# Patient Record
Sex: FEMALE | Race: WHITE | Employment: FULL TIME | URBAN - METROPOLITAN AREA
[De-identification: names, ages, dates, MRNs, and addresses within clinical notes are randomized per-mention and may not be internally consistent; named-entity substitution may affect disease eponyms.]

---

## 2023-02-22 ENCOUNTER — OFFICE VISIT (OUTPATIENT)
Dept: GYNECOLOGY | Age: 33
End: 2023-02-22

## 2023-02-22 VITALS
SYSTOLIC BLOOD PRESSURE: 102 MMHG | DIASTOLIC BLOOD PRESSURE: 60 MMHG | HEIGHT: 66 IN | WEIGHT: 220 LBS | BODY MASS INDEX: 35.36 KG/M2

## 2023-02-22 DIAGNOSIS — R10.2 PELVIC CRAMPING: Primary | ICD-10-CM

## 2023-02-22 PROCEDURE — 99214 OFFICE O/P EST MOD 30 MIN: CPT | Performed by: OBSTETRICS & GYNECOLOGY

## 2023-02-22 PROCEDURE — 76830 TRANSVAGINAL US NON-OB: CPT | Performed by: OBSTETRICS & GYNECOLOGY

## 2023-02-22 NOTE — PROGRESS NOTES
ALBARO Bacon is a 33 y.o. female seen for pelvic cramping. This started several days ago and the pain was similar to cramping like a period but much worse. She was having some diarrhea. The diarrhea has now stopped and the pain is better but not completely gone. Past Medical History, Past Surgical History, Family history, Social History, and Medications were all reviewed with the patient today and updated as necessary. Current Outpatient Medications   Medication Sig    levonorgestrel (MIRENA) IUD 52 mg 1 Device by IntraUTERine route once     No current facility-administered medications for this visit.      Allergies   Allergen Reactions    Cefaclor Other (See Comments)     As a child     Past Medical History:   Diagnosis Date    Bulging lumbar disc      Past Surgical History:   Procedure Laterality Date    WISDOM TOOTH EXTRACTION       Family History   Problem Relation Age of Onset    No Known Problems Maternal Uncle     No Known Problems Brother     No Known Problems Paternal Aunt     No Known Problems Paternal Uncle     No Known Problems Maternal Grandmother     No Known Problems Maternal Aunt     No Known Problems Paternal Grandmother     No Known Problems Paternal Grandfather     No Known Problems Sister     Hypertension Mother     No Known Problems Father     No Known Problems Maternal Grandfather       Social History     Tobacco Use    Smoking status: Never    Smokeless tobacco: Never   Substance Use Topics    Alcohol use: Yes     Comment: Socially       Social History     Substance and Sexual Activity   Sexual Activity Not on file    Comment: Inserted 2017     OB History    Para Term  AB Living   0 0 0 0 0 0   SAB IAB Ectopic Molar Multiple Live Births   0 0 0 0 0 0       Health Maintenance  Mammogram:   Colonoscopy:   Bone Density:  Pap [id number] Neg    ROS:    Review of Systems  General: Not Present- Chills, Fever, Fatigue, Insomnia, Hot flashes/Night sweats, Weight gain  Skin: Not Present- Bruising, Change in Wart/Mole, Excessive Sweating, Itching, Nail Changes, New Lesions, Rash, Skin Color Changes and Ulcer. HEENT: Not Present- Headache, Blurred Vision, Double Vision, Glaucoma, Visual Disturbances, Hearing Loss, Ringing in the Ears, Vertigo, Nose Bleed, Bleeding Gums, Hoarseness and Sore Throat. Neck: Not Present- Neck Pain and Neck Swelling. Respiratory: Not Present- Cough, Difficulty Breathing and Difficulty Breathing on Exertion. Breast: Not Present- Breast Mass, Breast Pain, Breast Swelling, Nipple Discharge, Nipple Pain, Recent Breast Size Changes and Skin Changes. Cardiovascular: Not Present- Abnormal Blood Pressure, Chest Pain, Edema, Fainting / Blacking Out, Palpitations, Shortness of Breath and Swelling of Extremities. Gastrointestinal: Not Present- Abdominal Pain, Abdominal Swelling, Bloating, Change in Bowel Habits, Constipation, Diarrhea, Difficulty Swallowing, Gets full quickly at meals, Nausea, Rectal Bleeding and Vomiting. Female Genitourinary: Not Present- Dysmenorrhea, Dyspareunia, Decreased libido, Excessive Menstrual Bleeding, Menstrual Irregularities, Pelvic Pain, Urinary Complaints, Vaginal Discharge, Vaginal itching/burning, Vaginal odor  Musculoskeletal: Not Present- Joint Pain and Muscle Pain. Neurological: Not Present- Dizziness, Fainting, Headaches and Seizures. Psychiatric: Not Present- Anxiety, Depression, Mood changes and Panic Attacks. Endocrine: Not Present- Appetite Changes, Cold Intolerance, Excessive Thirst, Excessive Urination and Heat Intolerance. Hematology: Not Present- Abnormal Bleeding, Easy Bruising and Enlarged Lymph Nodes. PHYSICAL EXAM:    /60   Ht 5' 6\" (1.676 m)   Wt 220 lb (99.8 kg)   BMI 35.51 kg/m²     Constitutional: Vital signs are normal. She appears well-developed and well-nourished. She is active and cooperative. Neurological: She is alert.    Nursing note and vitals reviewed.      Medical problems and test results were reviewed with the patient today.     ASSESSMENT and PLAN    1. Pelvic cramping  -     US NON OB TRANSVAGINAL     Comment   [id number]----demdzc cramping   HISTORY: Started having pelvic cramping midline on Sunday. Felt like she was going to have a period but doesn't   have periods because she has an IUD. IUD was placed 4/20/2017.   COMPARISON: Ultrasound 11/19/19---normal   -------------------------------------------------------------------------------------------------------   Uterus appears normal   Endo = 2.1mm with IUD seen and in place with arms extended.   Rt ovy appears normal   Lt ovy appears normal   No free fluid seen   Date: 02/22/2023 Perf. Physician: Dr. Oneida Gonsalez MD Sonographer: Sepideh Kaur RDMS         I reviewed the images of the US done in my office and discussed with patient.  Will observe for now.  Advised if pain does not go away may need CT scan      Time:  I spent  30 minutes in preparing to see patient (including chart review and preparation), obtaining and/or reviewing additional medical history, performing a physical exam and evaluation, documenting clinical information in the electronic health record, independently interpreting results, communicating results to patient, family or caregiver, and/or coordinating care.          No follow-ups on file.       ONEIDA GONSALEZ MD

## 2023-03-13 NOTE — PROGRESS NOTES
HPI    Toby Gleason is a 33 y.o. female seen for annual GYN exam.  She is not having any more cramping or diarrhea    Past Medical History, Past Surgical History, Family history, Social History, and Medications were all reviewed with the patient today and updated as necessary. Current Outpatient Medications   Medication Sig    levonorgestrel (MIRENA) IUD 52 mg 1 Device by IntraUTERine route once     No current facility-administered medications for this visit. Allergies   Allergen Reactions    Cefaclor Other (See Comments)     As a child     Past Medical History:   Diagnosis Date    Bulging lumbar disc      Past Surgical History:   Procedure Laterality Date    WISDOM TOOTH EXTRACTION       Family History   Problem Relation Age of Onset    No Known Problems Maternal Uncle     No Known Problems Brother     No Known Problems Paternal Aunt     No Known Problems Paternal Uncle     No Known Problems Maternal Grandmother     No Known Problems Maternal Aunt     No Known Problems Paternal Grandmother     No Known Problems Paternal Grandfather     No Known Problems Sister     Hypertension Mother     No Known Problems Father     No Known Problems Maternal Grandfather       Social History     Tobacco Use    Smoking status: Never    Smokeless tobacco: Never   Substance Use Topics    Alcohol use: Yes     Comment: Socially       Social History     Substance and Sexual Activity   Sexual Activity Yes    Birth control/protection: I.U.D.     Comment: Inserted 2017     OB History    Para Term  AB Living   0 0 0 0 0 0   SAB IAB Ectopic Molar Multiple Live Births   0 0 0 0 0 0       Health Maintenance  Mammogram:   Colonoscopy:   Bone Density:  Pap smear: 2022      Review of Systems  General: Not Present- Chills, Fever, Fatigue, Insomnia, Hot flashes/Night sweats, Weight gain  Skin: Not Present- Bruising, Change in Wart/Mole, Excessive Sweating, Itching, Nail Changes, New Lesions, Rash, Skin Color Changes and Ulcer. HEENT: Not Present- Headache, Blurred Vision, Double Vision, Glaucoma, Visual Disturbances, Hearing Loss, Ringing in the Ears, Vertigo, Nose Bleed, Bleeding Gums, Hoarseness and Sore Throat. Neck: Not Present- Neck Pain and Neck Swelling. Respiratory: Not Present- Cough, Difficulty Breathing and Difficulty Breathing on Exertion. Breast: Not Present- Breast Mass, Breast Pain, Breast Swelling, Nipple Discharge, Nipple Pain, Recent Breast Size Changes and Skin Changes. Cardiovascular: Not Present- Abnormal Blood Pressure, Chest Pain, Edema, Fainting / Blacking Out, Palpitations, Shortness of Breath and Swelling of Extremities. Gastrointestinal: Not Present- Abdominal Pain, Abdominal Swelling, Bloating, Change in Bowel Habits, Constipation, Diarrhea, Difficulty Swallowing, Gets full quickly at meals, Nausea, Rectal Bleeding and Vomiting. Female Genitourinary: Not Present- Dysmenorrhea, Dyspareunia, Decreased libido, Excessive Menstrual Bleeding, Menstrual Irregularities, Pelvic Pain, Urinary Complaints, Vaginal Discharge, Vaginal itching/burning, Vaginal odor  Musculoskeletal: Not Present- Joint Pain and Muscle Pain. Neurological: Not Present- Dizziness, Fainting, Headaches and Seizures. Psychiatric: Not Present- Anxiety, Depression, Mood changes and Panic Attacks. Endocrine: Not Present- Appetite Changes, Cold Intolerance, Excessive Thirst, Excessive Urination and Heat Intolerance. Hematology: Not Present- Abnormal Bleeding, Easy Bruising and Enlarged Lymph Nodes. PHYSICAL EXAM:     /80   Ht 5' 6\" (1.676 m)   Wt 220 lb (99.8 kg)   BMI 35.51 kg/m²     Physical Exam   General   Mental Status - Alert. General Appearance - Cooperative. Integumentary   General Characteristics: Overall examination of the patient's skin reveals - no rashes and no suspicious lesions. Head and Neck  Head - normocephalic, atraumatic with no lesions or palpable masses.    Neck Note: Normal Thyroid   Gland Characteristics - normal size and consistency and no palpable nodules. Chest and Lung Exam   Chest and lung exam reveals - on auscultation, normal breath sounds, no adventitious sounds and normal vocal resonance. Breast   Breast - Left - Normal. Right - Normal.     Cardiovascular   Cardiovascular examination reveals - normal heart sounds, regular rate and rhythm with no murmurs. Abdomen   Inspection: - Inspection Normal.   Palpation/Percussion: Palpation and Percussion of the abdomen reveal - Non Tender, No Rebound tenderness, No Rigidity (guarding), No hepatosplenomegaly, No Palpable abdominal masses and Soft. Auscultation: Auscultation of the abdomen reveals - Bowel sounds normal.     Female Genitourinary     External Genitalia   Vulva: - Normal. Perineum - Normal. Bartholin's Gland - Bilateral - Normal. Clitoris - Normal.   Introitus: Characteristics - Normal.   Urethra: Characteristics - Normal.     Speculum & Bimanual   Vagina: Vaginal Mucosa - Normal.   Vaginal Wall: - Normal.   Vaginal Lesions - None. Cervix: Characteristics - Normal.   Uterus: Characteristics - Normal.   Adnexa: - Normal.   Bladder - Normal.     Peripheral Vascular   Normal    Neuropsychiatric   Examination of related systems reveals - The patient is well-nourished and well-groomed. Mental status exam performed with findings of - Oriented X3 with appropriate mood and affect. Musculoskeletal  Normal      General Lymphatics  Normal           Medical problems and test results were reviewed with the patient today. ASSESSMENT and PLAN    1. Well woman exam  2. Screening for malignant neoplasm of cervix         No follow-ups on file.        Jenifer Mcpherson MD  3/14/2023

## 2023-03-14 ENCOUNTER — OFFICE VISIT (OUTPATIENT)
Dept: GYNECOLOGY | Age: 33
End: 2023-03-14
Payer: COMMERCIAL

## 2023-03-14 VITALS
SYSTOLIC BLOOD PRESSURE: 110 MMHG | DIASTOLIC BLOOD PRESSURE: 80 MMHG | BODY MASS INDEX: 35.36 KG/M2 | HEIGHT: 66 IN | WEIGHT: 220 LBS

## 2023-03-14 DIAGNOSIS — Z12.4 ENCOUNTER FOR PAPANICOLAOU SMEAR FOR CERVICAL CANCER SCREENING: ICD-10-CM

## 2023-03-14 DIAGNOSIS — Z01.419 WELL WOMAN EXAM: Primary | ICD-10-CM

## 2023-03-14 PROCEDURE — 99395 PREV VISIT EST AGE 18-39: CPT | Performed by: OBSTETRICS & GYNECOLOGY

## 2023-03-21 LAB
CYTOLOGIST CVX/VAG CYTO: NORMAL
CYTOLOGY CVX/VAG DOC THIN PREP: NORMAL
HPV REFLEX: NORMAL
Lab: NORMAL
Lab: NORMAL
PATH REPORT.FINAL DX SPEC: NORMAL
STAT OF ADQ CVX/VAG CYTO-IMP: NORMAL

## 2023-10-24 ENCOUNTER — OFFICE VISIT (OUTPATIENT)
Dept: FAMILY MEDICINE CLINIC | Facility: CLINIC | Age: 33
End: 2023-10-24
Payer: COMMERCIAL

## 2023-10-24 VITALS
SYSTOLIC BLOOD PRESSURE: 118 MMHG | OXYGEN SATURATION: 98 % | BODY MASS INDEX: 35.39 KG/M2 | WEIGHT: 220.2 LBS | DIASTOLIC BLOOD PRESSURE: 80 MMHG | HEART RATE: 71 BPM | HEIGHT: 66 IN

## 2023-10-24 DIAGNOSIS — Z11.59 NEED FOR HEPATITIS C SCREENING TEST: ICD-10-CM

## 2023-10-24 DIAGNOSIS — Z20.2 POSSIBLE EXPOSURE TO STD: ICD-10-CM

## 2023-10-24 DIAGNOSIS — Z23 ENCOUNTER FOR IMMUNIZATION: ICD-10-CM

## 2023-10-24 DIAGNOSIS — E55.9 VITAMIN D DEFICIENCY: ICD-10-CM

## 2023-10-24 DIAGNOSIS — Z11.59 ENCOUNTER FOR SCREENING FOR VIRAL DISEASE: ICD-10-CM

## 2023-10-24 DIAGNOSIS — Z00.00 ROUTINE GENERAL MEDICAL EXAMINATION AT A HEALTH CARE FACILITY: Primary | ICD-10-CM

## 2023-10-24 DIAGNOSIS — Z11.4 ENCOUNTER FOR SCREENING FOR HIV: ICD-10-CM

## 2023-10-24 LAB
25(OH)D3 SERPL-MCNC: 20 NG/ML (ref 30–100)
ALBUMIN SERPL-MCNC: 4.4 G/DL (ref 3.5–5)
ALBUMIN/GLOB SERPL: 1.3 (ref 0.4–1.6)
ALP SERPL-CCNC: 62 U/L (ref 50–136)
ALT SERPL-CCNC: 20 U/L (ref 12–65)
ANION GAP SERPL CALC-SCNC: 6 MMOL/L (ref 2–11)
AST SERPL-CCNC: 13 U/L (ref 15–37)
BASOPHILS # BLD: 0 K/UL (ref 0–0.2)
BASOPHILS NFR BLD: 1 % (ref 0–2)
BILIRUB SERPL-MCNC: 0.8 MG/DL (ref 0.2–1.1)
BILIRUBIN, URINE, POC: NEGATIVE
BLOOD URINE, POC: NEGATIVE
BUN SERPL-MCNC: 9 MG/DL (ref 6–23)
CALCIUM SERPL-MCNC: 9.1 MG/DL (ref 8.3–10.4)
CHLORIDE SERPL-SCNC: 112 MMOL/L (ref 101–110)
CHOLEST SERPL-MCNC: 171 MG/DL
CO2 SERPL-SCNC: 25 MMOL/L (ref 21–32)
CREAT SERPL-MCNC: 0.9 MG/DL (ref 0.6–1)
DIFFERENTIAL METHOD BLD: ABNORMAL
EOSINOPHIL # BLD: 0.1 K/UL (ref 0–0.8)
EOSINOPHIL NFR BLD: 2 % (ref 0.5–7.8)
ERYTHROCYTE [DISTWIDTH] IN BLOOD BY AUTOMATED COUNT: 13.1 % (ref 11.9–14.6)
GLOBULIN SER CALC-MCNC: 3.4 G/DL (ref 2.8–4.5)
GLUCOSE SERPL-MCNC: 97 MG/DL (ref 65–100)
GLUCOSE URINE, POC: NEGATIVE
HCT VFR BLD AUTO: 46.6 % (ref 35.8–46.3)
HCV AB SER QL: NONREACTIVE
HDLC SERPL-MCNC: 30 MG/DL (ref 40–60)
HDLC SERPL: 5.7
HGB BLD-MCNC: 15.7 G/DL (ref 11.7–15.4)
HIV 1+2 AB+HIV1 P24 AG SERPL QL IA: NONREACTIVE
HIV 1/2 RESULT COMMENT: NORMAL
IMM GRANULOCYTES # BLD AUTO: 0 K/UL (ref 0–0.5)
IMM GRANULOCYTES NFR BLD AUTO: 0 % (ref 0–5)
KETONES, URINE, POC: NEGATIVE
LDLC SERPL CALC-MCNC: 84.4 MG/DL
LEUKOCYTE ESTERASE, URINE, POC: NEGATIVE
LYMPHOCYTES # BLD: 1.7 K/UL (ref 0.5–4.6)
LYMPHOCYTES NFR BLD: 30 % (ref 13–44)
MCH RBC QN AUTO: 30.8 PG (ref 26.1–32.9)
MCHC RBC AUTO-ENTMCNC: 33.7 G/DL (ref 31.4–35)
MCV RBC AUTO: 91.6 FL (ref 82–102)
MONOCYTES # BLD: 0.3 K/UL (ref 0.1–1.3)
MONOCYTES NFR BLD: 6 % (ref 4–12)
NEUTS SEG # BLD: 3.4 K/UL (ref 1.7–8.2)
NEUTS SEG NFR BLD: 61 % (ref 43–78)
NITRITE, URINE, POC: NEGATIVE
NRBC # BLD: 0 K/UL (ref 0–0.2)
PH, URINE, POC: 5.5 (ref 4.6–8)
PLATELET # BLD AUTO: 302 K/UL (ref 150–450)
PMV BLD AUTO: 9.9 FL (ref 9.4–12.3)
POTASSIUM SERPL-SCNC: 4.1 MMOL/L (ref 3.5–5.1)
PROT SERPL-MCNC: 7.8 G/DL (ref 6.3–8.2)
PROTEIN,URINE, POC: NEGATIVE
RBC # BLD AUTO: 5.09 M/UL (ref 4.05–5.2)
SODIUM SERPL-SCNC: 143 MMOL/L (ref 133–143)
SPECIFIC GRAVITY, URINE, POC: 1.03 (ref 1–1.03)
TRIGL SERPL-MCNC: 283 MG/DL (ref 35–150)
TSH W FREE THYROID IF ABNORMAL: 2.48 UIU/ML (ref 0.36–3.74)
URINALYSIS CLARITY, POC: CLEAR
URINALYSIS COLOR, POC: YELLOW
UROBILINOGEN, POC: NORMAL
VLDLC SERPL CALC-MCNC: 56.6 MG/DL (ref 6–23)
WBC # BLD AUTO: 5.6 K/UL (ref 4.3–11.1)

## 2023-10-24 PROCEDURE — 90674 CCIIV4 VAC NO PRSV 0.5 ML IM: CPT | Performed by: NURSE PRACTITIONER

## 2023-10-24 PROCEDURE — 81002 URINALYSIS NONAUTO W/O SCOPE: CPT | Performed by: NURSE PRACTITIONER

## 2023-10-24 PROCEDURE — 90471 IMMUNIZATION ADMIN: CPT | Performed by: NURSE PRACTITIONER

## 2023-10-24 PROCEDURE — 99385 PREV VISIT NEW AGE 18-39: CPT | Performed by: NURSE PRACTITIONER

## 2023-10-24 SDOH — ECONOMIC STABILITY: INCOME INSECURITY: HOW HARD IS IT FOR YOU TO PAY FOR THE VERY BASICS LIKE FOOD, HOUSING, MEDICAL CARE, AND HEATING?: NOT HARD AT ALL

## 2023-10-24 SDOH — ECONOMIC STABILITY: FOOD INSECURITY: WITHIN THE PAST 12 MONTHS, THE FOOD YOU BOUGHT JUST DIDN'T LAST AND YOU DIDN'T HAVE MONEY TO GET MORE.: NEVER TRUE

## 2023-10-24 SDOH — ECONOMIC STABILITY: HOUSING INSECURITY
IN THE LAST 12 MONTHS, WAS THERE A TIME WHEN YOU DID NOT HAVE A STEADY PLACE TO SLEEP OR SLEPT IN A SHELTER (INCLUDING NOW)?: NO

## 2023-10-24 SDOH — ECONOMIC STABILITY: FOOD INSECURITY: WITHIN THE PAST 12 MONTHS, YOU WORRIED THAT YOUR FOOD WOULD RUN OUT BEFORE YOU GOT MONEY TO BUY MORE.: NEVER TRUE

## 2023-10-24 ASSESSMENT — ENCOUNTER SYMPTOMS
VOICE CHANGE: 0
ABDOMINAL DISTENTION: 0
ABDOMINAL PAIN: 0
EYE DISCHARGE: 0
CHOKING: 0
COUGH: 0
RESPIRATORY NEGATIVE: 1
STRIDOR: 0
ALLERGIC/IMMUNOLOGIC NEGATIVE: 1
EYE ITCHING: 0
CONSTIPATION: 0
SHORTNESS OF BREATH: 0
COLOR CHANGE: 0
SINUS PAIN: 0
RECTAL PAIN: 0
NAUSEA: 0
BACK PAIN: 0
SORE THROAT: 0
TROUBLE SWALLOWING: 0
GASTROINTESTINAL NEGATIVE: 1
ANAL BLEEDING: 0
FACIAL SWELLING: 0
PHOTOPHOBIA: 0
SINUS PRESSURE: 0
EYE PAIN: 0
EYES NEGATIVE: 1
CHEST TIGHTNESS: 0
DIARRHEA: 0
BLOOD IN STOOL: 0
VOMITING: 0
RHINORRHEA: 0
WHEEZING: 0
APNEA: 0
EYE REDNESS: 0

## 2023-10-24 ASSESSMENT — ANXIETY QUESTIONNAIRES
IF YOU CHECKED OFF ANY PROBLEMS ON THIS QUESTIONNAIRE, HOW DIFFICULT HAVE THESE PROBLEMS MADE IT FOR YOU TO DO YOUR WORK, TAKE CARE OF THINGS AT HOME, OR GET ALONG WITH OTHER PEOPLE: NOT DIFFICULT AT ALL
3. WORRYING TOO MUCH ABOUT DIFFERENT THINGS: 1
4. TROUBLE RELAXING: 0
5. BEING SO RESTLESS THAT IT IS HARD TO SIT STILL: 0
1. FEELING NERVOUS, ANXIOUS, OR ON EDGE: 1
7. FEELING AFRAID AS IF SOMETHING AWFUL MIGHT HAPPEN: 0
6. BECOMING EASILY ANNOYED OR IRRITABLE: 1
2. NOT BEING ABLE TO STOP OR CONTROL WORRYING: 0
GAD7 TOTAL SCORE: 3

## 2023-10-24 ASSESSMENT — PATIENT HEALTH QUESTIONNAIRE - PHQ9
SUM OF ALL RESPONSES TO PHQ QUESTIONS 1-9: 0
1. LITTLE INTEREST OR PLEASURE IN DOING THINGS: 0
SUM OF ALL RESPONSES TO PHQ QUESTIONS 1-9: 0

## 2023-10-24 NOTE — PROGRESS NOTES
IM, PF, 0.5 mL    Patient is due for annual flu vaccine. Discussed current CDC recommendation for immunization. Pt is amenable to receiving vaccine today in office. VIS provided. Vaccine administered. 3. Encounter for screening for HIV  -     HIV 1/2 Ag/Ab, 4TH Generation,W Rflx Confirm; Future    4. Need for hepatitis C screening test  -     Hepatitis C Antibody; Future    5. Vitamin D deficiency  -     Vitamin D 25 Hydroxy; Future    6. Encounter for screening for viral disease  -     Chlamydia, Gonorrhea, Trichomoniasis; Future  -     RPR; Future  -     HSV 1 and 2 Specific Ab, IgG; Future    7. Possible exposure to STD  -     Chlamydia, Gonorrhea, Trichomoniasis; Future  -     RPR; Future  -     HSV 1 and 2 Specific Ab, IgG;  Future      Orders Placed This Encounter   Procedures    Chlamydia, Gonorrhea, Trichomoniasis     Standing Status:   Future     Standing Expiration Date:   10/24/2024    Influenza, FLUCELVAX, (age 10 mo+), IM, PF, 0.5 mL    Hepatitis C Antibody     Standing Status:   Future     Standing Expiration Date:   10/24/2024    Vitamin D 25 Hydroxy     Standing Status:   Future     Standing Expiration Date:   10/24/2024    Lipid Panel     Standing Status:   Future     Standing Expiration Date:   10/24/2024    Comprehensive Metabolic Panel     Standing Status:   Future     Standing Expiration Date:   10/24/2024    HIV 1/2 Ag/Ab, 4TH Generation,W Rflx Confirm     Standing Status:   Future     Standing Expiration Date:   10/24/2024    CBC with Auto Differential     Standing Status:   Future     Standing Expiration Date:   10/24/2024    TSH with Reflex     Standing Status:   Future     Standing Expiration Date:   10/24/2024    RPR     Standing Status:   Future     Standing Expiration Date:   10/24/2024    HSV 1 and 2 Specific Ab, IgG     Standing Status:   Future     Standing Expiration Date:   10/24/2024    AMB POC URINALYSIS DIP STICK MANUAL W/O MICRO     Standing Status:   Future     Standing

## 2023-10-25 LAB
HSV1 IGG SER IA-ACNC: 36.5 INDEX (ref 0–0.9)
HSV2 IGG SER IA-ACNC: 0.94 INDEX (ref 0–0.9)
RPR SER QL: NONREACTIVE

## 2023-10-26 LAB
C TRACH RRNA SPEC QL NAA+PROBE: NEGATIVE
N GONORRHOEA RRNA SPEC QL NAA+PROBE: NEGATIVE
SPECIMEN SOURCE: NORMAL
T VAGINALIS RRNA SPEC QL NAA+PROBE: NEGATIVE

## 2023-10-26 RX ORDER — ERGOCALCIFEROL 1.25 MG/1
50000 CAPSULE ORAL WEEKLY
Qty: 12 CAPSULE | Refills: 0 | Status: SHIPPED | OUTPATIENT
Start: 2023-10-26

## 2024-01-17 ENCOUNTER — NURSE ONLY (OUTPATIENT)
Dept: FAMILY MEDICINE CLINIC | Facility: CLINIC | Age: 34
End: 2024-01-17

## 2024-01-17 DIAGNOSIS — E55.9 VITAMIN D DEFICIENCY: Primary | ICD-10-CM

## 2024-01-17 DIAGNOSIS — E55.9 VITAMIN D DEFICIENCY: ICD-10-CM

## 2024-01-17 DIAGNOSIS — E78.5 BORDERLINE HYPERLIPIDEMIA: ICD-10-CM

## 2024-01-17 LAB
25(OH)D3 SERPL-MCNC: 51.4 NG/ML (ref 30–100)
ALBUMIN SERPL-MCNC: 4 G/DL (ref 3.5–5)
ALBUMIN/GLOB SERPL: 1.3 (ref 0.4–1.6)
ALP SERPL-CCNC: 58 U/L (ref 50–136)
ALT SERPL-CCNC: 21 U/L (ref 12–65)
ANION GAP SERPL CALC-SCNC: 4 MMOL/L (ref 2–11)
AST SERPL-CCNC: 12 U/L (ref 15–37)
BILIRUB SERPL-MCNC: 0.9 MG/DL (ref 0.2–1.1)
BUN SERPL-MCNC: 10 MG/DL (ref 6–23)
CALCIUM SERPL-MCNC: 8.7 MG/DL (ref 8.3–10.4)
CHLORIDE SERPL-SCNC: 107 MMOL/L (ref 103–113)
CHOLEST SERPL-MCNC: 165 MG/DL
CO2 SERPL-SCNC: 26 MMOL/L (ref 21–32)
CREAT SERPL-MCNC: 0.9 MG/DL (ref 0.6–1)
GLOBULIN SER CALC-MCNC: 3 G/DL (ref 2.8–4.5)
GLUCOSE SERPL-MCNC: 94 MG/DL (ref 65–100)
HDLC SERPL-MCNC: 36 MG/DL (ref 40–60)
HDLC SERPL: 4.6
LDLC SERPL CALC-MCNC: 91.6 MG/DL
POTASSIUM SERPL-SCNC: 4.1 MMOL/L (ref 3.5–5.1)
PROT SERPL-MCNC: 7 G/DL (ref 6.3–8.2)
SODIUM SERPL-SCNC: 137 MMOL/L (ref 136–146)
TRIGL SERPL-MCNC: 187 MG/DL (ref 35–150)
VLDLC SERPL CALC-MCNC: 37.4 MG/DL (ref 6–23)

## 2024-02-27 ENCOUNTER — OFFICE VISIT (OUTPATIENT)
Dept: FAMILY MEDICINE CLINIC | Facility: CLINIC | Age: 34
End: 2024-02-27
Payer: COMMERCIAL

## 2024-02-27 VITALS
BODY MASS INDEX: 34.39 KG/M2 | HEIGHT: 66 IN | HEART RATE: 88 BPM | SYSTOLIC BLOOD PRESSURE: 110 MMHG | WEIGHT: 214 LBS | OXYGEN SATURATION: 98 % | DIASTOLIC BLOOD PRESSURE: 80 MMHG

## 2024-02-27 DIAGNOSIS — R19.4 BOWEL HABIT CHANGES: ICD-10-CM

## 2024-02-27 DIAGNOSIS — K62.5 RECTAL BLEEDING: Primary | ICD-10-CM

## 2024-02-27 DIAGNOSIS — K64.8 OTHER HEMORRHOIDS: ICD-10-CM

## 2024-02-27 LAB
ALBUMIN SERPL-MCNC: 4 G/DL (ref 3.5–5)
ALBUMIN/GLOB SERPL: 1.3 (ref 0.4–1.6)
ALP SERPL-CCNC: 59 U/L (ref 50–136)
ALT SERPL-CCNC: 19 U/L (ref 12–65)
ANION GAP SERPL CALC-SCNC: 4 MMOL/L (ref 2–11)
AST SERPL-CCNC: 9 U/L (ref 15–37)
BILIRUB SERPL-MCNC: 0.7 MG/DL (ref 0.2–1.1)
BUN SERPL-MCNC: 9 MG/DL (ref 6–23)
CALCIUM SERPL-MCNC: 9.5 MG/DL (ref 8.3–10.4)
CHLORIDE SERPL-SCNC: 108 MMOL/L (ref 103–113)
CO2 SERPL-SCNC: 29 MMOL/L (ref 21–32)
CREAT SERPL-MCNC: 0.8 MG/DL (ref 0.6–1)
GLOBULIN SER CALC-MCNC: 3.1 G/DL (ref 2.8–4.5)
GLUCOSE SERPL-MCNC: 97 MG/DL (ref 65–100)
GRANS ABSOLUTE, POC: 2.7 K/UL
GRANULOCYTES %, POC: 53.3 %
HEMATOCRIT, POC: 45.1 %
HEMOGLOBIN, POC: 14.6 G/DL
LYMPHOCYTE %, POC: 39 %
LYMPHS ABSOLUTE, POC: 2 K/UL
MCH, POC: NORMAL PG (ref 40–?)
MCHC, POC: 32.4
MCV, POC: 96.3
MONOCYTE %, POC: 7.7 %
MONOCYTE, ABSOLUTE POC: 0.4 K/UL
MPV, POC: 7.5 FL
PLATELET COUNT, POC: 321 K/UL
POTASSIUM SERPL-SCNC: 4.3 MMOL/L (ref 3.5–5.1)
PROT SERPL-MCNC: 7.1 G/DL (ref 6.3–8.2)
RBC, POC: 4.68 M/UL
RDW, POC: 13.4 %
SODIUM SERPL-SCNC: 141 MMOL/L (ref 136–146)
WBC, POC: 5.1 K/UL

## 2024-02-27 PROCEDURE — 85025 COMPLETE CBC W/AUTO DIFF WBC: CPT | Performed by: NURSE PRACTITIONER

## 2024-02-27 PROCEDURE — 99214 OFFICE O/P EST MOD 30 MIN: CPT | Performed by: NURSE PRACTITIONER

## 2024-02-27 RX ORDER — HYDROCORTISONE 25 MG/G
1 CREAM TOPICAL 3 TIMES DAILY PRN
Qty: 60 G | Refills: 2 | Status: SHIPPED | OUTPATIENT
Start: 2024-02-27

## 2024-02-27 ASSESSMENT — PATIENT HEALTH QUESTIONNAIRE - PHQ9
2. FEELING DOWN, DEPRESSED OR HOPELESS: 0
1. LITTLE INTEREST OR PLEASURE IN DOING THINGS: 0
SUM OF ALL RESPONSES TO PHQ9 QUESTIONS 1 & 2: 0
SUM OF ALL RESPONSES TO PHQ QUESTIONS 1-9: 0

## 2024-02-27 ASSESSMENT — ENCOUNTER SYMPTOMS
EYE ITCHING: 0
ABDOMINAL DISTENTION: 0
SHORTNESS OF BREATH: 0
CHEST TIGHTNESS: 0
BACK PAIN: 0
WHEEZING: 0
EYES NEGATIVE: 1
CONSTIPATION: 0
BLOOD IN STOOL: 0
EYE PAIN: 0
EYE REDNESS: 0
ABDOMINAL PAIN: 0
RHINORRHEA: 0
RECTAL PAIN: 0
TROUBLE SWALLOWING: 0
ALLERGIC/IMMUNOLOGIC NEGATIVE: 1
ANAL BLEEDING: 0
EYE DISCHARGE: 0
VOICE CHANGE: 0
COLOR CHANGE: 0
PHOTOPHOBIA: 0
APNEA: 0
CHOKING: 0
SORE THROAT: 0
RESPIRATORY NEGATIVE: 1
NAUSEA: 0
FACIAL SWELLING: 0
STRIDOR: 0
SINUS PAIN: 0
GASTROINTESTINAL NEGATIVE: 1
SINUS PRESSURE: 0
VOMITING: 0
DIARRHEA: 0
COUGH: 0

## 2024-02-27 NOTE — PROGRESS NOTES
difficulty urinating, dysuria, enuresis, flank pain, frequency, genital sores, hematuria and urgency.   Musculoskeletal: Negative.  Negative for arthralgias, back pain, gait problem, joint swelling, myalgias, neck pain and neck stiffness.   Skin: Negative.  Negative for color change, pallor, rash and wound.   Allergic/Immunologic: Negative.  Negative for environmental allergies, food allergies and immunocompromised state.   Neurological: Negative.  Negative for dizziness, tremors, seizures, syncope, facial asymmetry, speech difficulty, weakness, light-headedness, numbness and headaches.   Hematological: Negative.  Negative for adenopathy. Does not bruise/bleed easily.   Psychiatric/Behavioral: Negative.  Negative for agitation, behavioral problems, confusion, decreased concentration, dysphoric mood, hallucinations, self-injury, sleep disturbance and suicidal ideas. The patient is not nervous/anxious and is not hyperactive.        OBJECTIVE:  /80   Pulse 88   Ht 1.676 m (5' 6\")   Wt 97.1 kg (214 lb)   SpO2 98%   BMI 34.54 kg/m²      Physical Exam  Vitals and nursing note reviewed. Exam conducted with a chaperone present (Exam chaperoned by Millie Nogueira MA).   Constitutional:       Appearance: Normal appearance.   HENT:      Head: Normocephalic and atraumatic.   Cardiovascular:      Rate and Rhythm: Normal rate and regular rhythm.      Pulses: Normal pulses.      Heart sounds: Normal heart sounds.   Pulmonary:      Effort: Pulmonary effort is normal.      Breath sounds: Normal breath sounds.   Genitourinary:     Rectum: Guaiac result negative.      Comments: Negative guaiac stool.  Small external hemorrhoid at 12:00 location and 6:00 location.  Internal hemorrhoids noted to 2 to 4 o'clock position  Neurological:      Mental Status: She is alert.           Medical problems and test results were reviewed with the patient today.       ASSESSMENT and PLAN    1. Rectal bleeding  -     AMB POC KTY COMPLETE CBC;

## 2024-04-16 NOTE — PROGRESS NOTES
0   SAB IAB Ectopic Molar Multiple Live Births   0 0 0 0 0 0       Health Maintenance    Pap smear:3/14/23      Review of Systems  General: Not Present- Chills, Fever, Fatigue, Insomnia, Hot flashes/Night sweats, Weight gain  Skin: Not Present- Bruising, Change in Wart/Mole, Excessive Sweating, Itching, Nail Changes, New Lesions, Rash, Skin Color Changes and Ulcer.  HEENT: Not Present- Headache, Blurred Vision, Double Vision, Glaucoma, Visual Disturbances, Hearing Loss, Ringing in the Ears, Vertigo, Nose Bleed, Bleeding Gums, Hoarseness and Sore Throat.  Neck: Not Present- Neck Pain and Neck Swelling.  Respiratory: Not Present- Cough, Difficulty Breathing and Difficulty Breathing on Exertion.  Breast: Not Present- Breast Mass, Breast Pain, Breast Swelling, Nipple Discharge, Nipple Pain, Recent Breast Size Changes and Skin Changes.  Cardiovascular: Not Present- Abnormal Blood Pressure, Chest Pain, Edema, Fainting / Blacking Out, Palpitations, Shortness of Breath and Swelling of Extremities.  Gastrointestinal: Not Present- Abdominal Pain, Abdominal Swelling, Bloating, Change in Bowel Habits, Constipation, Diarrhea, Difficulty Swallowing, Gets full quickly at meals, Nausea, Rectal Bleeding and Vomiting.  Female Genitourinary: Not Present- Dysmenorrhea, Dyspareunia, Decreased libido, Excessive Menstrual Bleeding, Menstrual Irregularities, Pelvic Pain, Urinary Complaints, Vaginal Discharge, Vaginal itching/burning, Vaginal odor  Musculoskeletal: Not Present- Joint Pain and Muscle Pain.  Neurological: Not Present- Dizziness, Fainting, Headaches and Seizures.  Psychiatric: Not Present- Anxiety, Depression, Mood changes and Panic Attacks.  Endocrine: Not Present- Appetite Changes, Cold Intolerance, Excessive Thirst, Excessive Urination and Heat Intolerance.  Hematology: Not Present- Abnormal Bleeding, Easy Bruising and Enlarged Lymph Nodes.         PHYSICAL EXAM:     /84   Ht 1.676 m (5' 6\")   Wt 96.2 kg (212 lb)

## 2024-04-22 ENCOUNTER — OFFICE VISIT (OUTPATIENT)
Dept: OBGYN CLINIC | Age: 34
End: 2024-04-22
Payer: COMMERCIAL

## 2024-04-22 VITALS
BODY MASS INDEX: 34.07 KG/M2 | WEIGHT: 212 LBS | SYSTOLIC BLOOD PRESSURE: 122 MMHG | HEIGHT: 66 IN | DIASTOLIC BLOOD PRESSURE: 84 MMHG

## 2024-04-22 DIAGNOSIS — Z01.419 ENCOUNTER FOR WELL WOMAN EXAM WITH ROUTINE GYNECOLOGICAL EXAM: Primary | ICD-10-CM

## 2024-04-22 DIAGNOSIS — Z12.4 CERVICAL CANCER SCREENING: ICD-10-CM

## 2024-04-22 PROCEDURE — 99459 PELVIC EXAMINATION: CPT | Performed by: OBSTETRICS & GYNECOLOGY

## 2024-04-22 PROCEDURE — 99395 PREV VISIT EST AGE 18-39: CPT | Performed by: OBSTETRICS & GYNECOLOGY

## 2024-04-26 LAB
COLLECTION METHOD: NORMAL
CYTOLOGIST CVX/VAG CYTO: NORMAL
CYTOLOGY CVX/VAG DOC THIN PREP: NORMAL
HPV REFLEX: NORMAL
Lab: NORMAL
OTHER PT INFO: NORMAL
PAP SOURCE: NORMAL
PATH REPORT.FINAL DX SPEC: NORMAL
PREV TREATMENT: NORMAL
STAT OF ADQ CVX/VAG CYTO-IMP: NORMAL

## 2024-05-15 NOTE — PATIENT INSTRUCTIONS
For hemorrhoids:   Take Miralax 1 cap daily for constipation and titrate up or down as needed until having regular daily bowel movements. Increase daily fiber intake to 25-35 grams daily either by dietary intake or an over-the-counter supplement such as psyllium husk fiber. Limit alcohol and fatty food intake. Drink at least 80 oz water daily. Exercise regularly and consider weight loss if appropriate based on your current weight. Avoid straining or lingering on the toilet longer than necessary. Try scheduled toilet time approximately 30 minutes after your first drink or meal of the day. Elevate your feet when toileting to bring your knees in line with your hips using a small stool or Squatty Potty. Review your medication list and discuss with your PCP whether any of these medications may exacerbate constipation. Try warm sitz baths 2-3 times daily for acute hemorrhoidal flare-up. Apply topical or suppository steroid (such as Preparation H or Anusol) for no longer than 7 days. You can also try lidocaine creams (Preparation H + Lidocaine) or Recticare 5% for pain relief. Try witch hazel pads for itching or burning. Keep the anal area clean and dry; avoid excessive or aggressive wiping. Consider wet wipe usage as needed for hygiene and comfort.       For constipation:  Recommend Miralax 1 cap 1-2 x daily and stool softener (ie Pericolace) twice daily. You can add milk of magnesia or magnesium citrate as needed.  Add Dulcolax or glycerin suppository if no bowel movement after 2-3 days.    Increase daily fiber intake to 25-35 grams daily either by dietary intake or an over-the-counter supplement such as psyllium husk fiber.  Limit alcohol and fatty food intake. Drink at least 80 oz water daily or more as needed to maintain adequate hydration. Exercise regularly and consider weight loss if appropriate based on your current weight. Avoid straining or lingering on the toilet longer than necessary. Try scheduled toilet time

## 2024-05-15 NOTE — PROGRESS NOTES
Abel Garcia (:  1990) is a 33 y.o. female new patient referred to our office for evaluation of the following chief complaint(s):  New Patient (Rectal bleeding)        Assessment & Plan   ASSESSMENT/PLAN:  1. BRBPR (bright red blood per rectum)  -     hydrocortisone (PROCTOSOL HC) 2.5 % CREA rectal cream; Apply BID internally to rectum x 7 days for rectal bleeding/hemorrhoids, Disp-28 g, R-0, Normal  2. Chronic diarrhea  -     Celiac Ab tTg DGP TIgA; Future  -     Calprotectin Stool; Future  -     High sensitivity CRP; Future  -     Sedimentation Rate; Future  -     hydrocortisone (PROCTOSOL HC) 2.5 % CREA rectal cream; Apply BID internally to rectum x 7 days for rectal bleeding/hemorrhoids, Disp-28 g, R-0, Normal  -     dicyclomine (BENTYL) 10 MG capsule; Take 1 capsule by mouth 3 times daily (before meals), Disp-90 capsule, R-2Normal  3. Abdominal cramping    -No obvious hemorrhoid on exam though possible a small internal hemorrhoid could be missed. Will give steroid cream to use in case bleeding recurs.  -She was diagnosed with IBS in childhood. We discussed work-up to r/o IBD, polyp, less likely malignancy. She prefers to start with less-invasive evaluation. Will obtain IBD screening as well as celiac panel. If abnormal will pursue endoscopy. Otherwise trial Bentyl, add stool bulking fiber supplement, and low-FODMAP diet with 3 month follow-up. If symptoms persist will revisit endoscopy at that time.    Subjective   SUBJECTIVE/OBJECTIVE  Abel Garcia is a 33 y.o. year old female with PMH is pertinent for obesity with BMI 34.22. She denies abdominal surgical history.     Patient was referred to our office for evaluation of rectal bleeding and bowel habit changes.  Referral note reviewed from 2024.  Noted to have history of IBS, primarily constipation type with associated abdominal cramping. No prior GI or endoscopic evaluation discovered on chart review.    Today patient reports

## 2024-05-16 ENCOUNTER — OFFICE VISIT (OUTPATIENT)
Age: 34
End: 2024-05-16
Payer: COMMERCIAL

## 2024-05-16 VITALS
WEIGHT: 212 LBS | BODY MASS INDEX: 34.07 KG/M2 | SYSTOLIC BLOOD PRESSURE: 116 MMHG | HEIGHT: 66 IN | DIASTOLIC BLOOD PRESSURE: 77 MMHG | OXYGEN SATURATION: 98 %

## 2024-05-16 DIAGNOSIS — R10.9 ABDOMINAL CRAMPING: ICD-10-CM

## 2024-05-16 DIAGNOSIS — K52.9 CHRONIC DIARRHEA: ICD-10-CM

## 2024-05-16 DIAGNOSIS — K62.5 BRBPR (BRIGHT RED BLOOD PER RECTUM): Primary | ICD-10-CM

## 2024-05-16 PROCEDURE — 99204 OFFICE O/P NEW MOD 45 MIN: CPT | Performed by: PHYSICIAN ASSISTANT

## 2024-05-16 RX ORDER — DICYCLOMINE HYDROCHLORIDE 10 MG/1
10 CAPSULE ORAL
Qty: 90 CAPSULE | Refills: 2 | Status: SHIPPED | OUTPATIENT
Start: 2024-05-16

## 2024-05-16 RX ORDER — HYDROCORTISONE 25 MG/G
CREAM TOPICAL
Qty: 28 G | Refills: 0 | Status: SHIPPED | OUTPATIENT
Start: 2024-05-16

## 2024-06-07 ENCOUNTER — TELEPHONE (OUTPATIENT)
Age: 34
End: 2024-06-07

## 2024-06-07 NOTE — TELEPHONE ENCOUNTER
----- Message from Melissa R Grinnell, PA-C sent at 6/6/2024  8:01 PM EDT -----  Please remind patient to submit labs/stool studies as discussed in office. Thanks.

## 2024-06-07 NOTE — TELEPHONE ENCOUNTER
As requested by provider. Talked to patient and gave reminder to submit labs/stool studies as discussed in office. Pt said she hasn't been able to do it, bur she has in mind, and it will get done asap.

## 2024-06-11 DIAGNOSIS — K52.9 CHRONIC DIARRHEA: ICD-10-CM

## 2024-06-11 LAB
CRP SERPL HS-MCNC: 0.7 MG/L (ref 0–3)
ERYTHROCYTE [SEDIMENTATION RATE] IN BLOOD: 2 MM/HR (ref 0–20)

## 2024-06-13 LAB
GLIADIN PEPTIDE IGA SER-ACNC: 4 UNITS (ref 0–19)
GLIADIN PEPTIDE IGG SER-ACNC: 3 UNITS (ref 0–19)
IGA SERPL-MCNC: 194 MG/DL (ref 87–352)
TTG IGA SER-ACNC: <2 U/ML (ref 0–3)
TTG IGG SER-ACNC: <2 U/ML (ref 0–5)

## 2024-06-18 ENCOUNTER — TELEMEDICINE (OUTPATIENT)
Dept: FAMILY MEDICINE CLINIC | Facility: CLINIC | Age: 34
End: 2024-06-18
Payer: COMMERCIAL

## 2024-06-18 DIAGNOSIS — F32.A DEPRESSION, UNSPECIFIED DEPRESSION TYPE: ICD-10-CM

## 2024-06-18 DIAGNOSIS — G47.00 INSOMNIA, UNSPECIFIED TYPE: ICD-10-CM

## 2024-06-18 DIAGNOSIS — F41.1 GENERALIZED ANXIETY DISORDER: Primary | ICD-10-CM

## 2024-06-18 LAB — CALPROTECTIN STL-MCNT: 19 UG/G (ref 0–120)

## 2024-06-18 PROCEDURE — 99214 OFFICE O/P EST MOD 30 MIN: CPT | Performed by: NURSE PRACTITIONER

## 2024-06-18 RX ORDER — ESCITALOPRAM OXALATE 5 MG/1
5 TABLET ORAL DAILY
Qty: 30 TABLET | Refills: 2 | Status: SHIPPED | OUTPATIENT
Start: 2024-06-18

## 2024-06-18 RX ORDER — HYDROXYZINE HYDROCHLORIDE 25 MG/1
TABLET, FILM COATED ORAL
Qty: 30 TABLET | Refills: 2 | Status: SHIPPED | OUTPATIENT
Start: 2024-06-18

## 2024-06-18 ASSESSMENT — ENCOUNTER SYMPTOMS
CHOKING: 0
GASTROINTESTINAL NEGATIVE: 1
ALLERGIC/IMMUNOLOGIC NEGATIVE: 1
STRIDOR: 0
COLOR CHANGE: 0
CONSTIPATION: 0
VOICE CHANGE: 0
CHEST TIGHTNESS: 0
EYE DISCHARGE: 0
ANAL BLEEDING: 0
COUGH: 0
BACK PAIN: 0
DIARRHEA: 0
SINUS PAIN: 0
EYE ITCHING: 0
ABDOMINAL DISTENTION: 0
ABDOMINAL PAIN: 0
RHINORRHEA: 0
BLOOD IN STOOL: 0
APNEA: 0
SHORTNESS OF BREATH: 0
EYE PAIN: 0
EYES NEGATIVE: 1
VOMITING: 0
SINUS PRESSURE: 0
RESPIRATORY NEGATIVE: 1
PHOTOPHOBIA: 0
EYE REDNESS: 0
TROUBLE SWALLOWING: 0
WHEEZING: 0
NAUSEA: 0
FACIAL SWELLING: 0
SORE THROAT: 0
RECTAL PAIN: 0

## 2024-06-18 NOTE — PROGRESS NOTES
PROGRESS NOTE        Consent:    Abel Garcia, was evaluated through a synchronous (real-time) audio-video encounter. The patient (or guardian if applicable) is aware that this is a billable service, which includes applicable co-pays. This Virtual Visit was conducted with patient's (and/or legal guardian's) consent. Patient identification was verified, and a caregiver was present when appropriate.   The patient was located at Home: [address] [facility] [address]  Provider was located at Facility (Appt Dept): [address]  [place] [address]  Confirm you are appropriately licensed, registered, or certified to deliver care in the state where the patient is located as indicated above. If you are not or unsure, please re-schedule the visit: Yes, I confirm.        On this date 6/18/2024 I have spent 32 minutes reviewing previous notes, test results and face to face (virtual) with the patient discussing the diagnosis and importance of compliance with the treatment plan as well as documenting on the day of the visit.. Greater than 50% of this visit was spent counseling the patient about test results, prognosis, importance of compliance, education about disease process, benefits of medications, instructions for management of acute symptoms, and follow up plans.      Chief Complaint   Patient presents with    Depression       SUBJECTIVE:     Abel Garcia is a very pleasant 33 y.o. female , with past medical history significant for IBS, seen virtually regarding complaints of having lack of motivation, lack of interest and pleasure of doing things, as well as getting irritated easily.  She reports no thoughts of self harm or suicide or homicide. She would like to discuss options for management.       Past Medical History, Past Surgical History, Family history, Social History, and Medications were all reviewed with the patient today and updated as necessary.       Current

## 2024-07-15 ENCOUNTER — TELEMEDICINE (OUTPATIENT)
Dept: FAMILY MEDICINE CLINIC | Facility: CLINIC | Age: 34
End: 2024-07-15

## 2024-07-15 DIAGNOSIS — E55.9 VITAMIN D DEFICIENCY: ICD-10-CM

## 2024-07-15 DIAGNOSIS — F41.1 GENERALIZED ANXIETY DISORDER: ICD-10-CM

## 2024-07-15 DIAGNOSIS — Z00.00 LABORATORY EXAMINATION ORDERED AS PART OF A COMPLETE PHYSICAL EXAMINATION: Primary | ICD-10-CM

## 2024-07-15 DIAGNOSIS — F32.A DEPRESSION, UNSPECIFIED DEPRESSION TYPE: ICD-10-CM

## 2024-07-15 PROBLEM — J20.9 ACUTE BRONCHITIS, UNSPECIFIED: Status: ACTIVE | Noted: 2023-12-14

## 2024-07-15 RX ORDER — ESCITALOPRAM OXALATE 5 MG/1
5 TABLET ORAL DAILY
Qty: 30 TABLET | Refills: 5 | Status: SHIPPED | OUTPATIENT
Start: 2024-07-15

## 2024-07-20 ASSESSMENT — ENCOUNTER SYMPTOMS
ANAL BLEEDING: 0
NAUSEA: 0
RHINORRHEA: 0
STRIDOR: 0
EYES NEGATIVE: 1
CONSTIPATION: 0
BLOOD IN STOOL: 0
RECTAL PAIN: 0
CHEST TIGHTNESS: 0
WHEEZING: 0
SORE THROAT: 0
ABDOMINAL DISTENTION: 0
EYE DISCHARGE: 0
EYE PAIN: 0
VOICE CHANGE: 0
BACK PAIN: 0
ALLERGIC/IMMUNOLOGIC NEGATIVE: 1
EYE REDNESS: 0
RESPIRATORY NEGATIVE: 1
TROUBLE SWALLOWING: 0
GASTROINTESTINAL NEGATIVE: 1
COLOR CHANGE: 0
SINUS PRESSURE: 0
SHORTNESS OF BREATH: 0
EYE ITCHING: 0
COUGH: 0
ABDOMINAL PAIN: 0
VOMITING: 0
DIARRHEA: 0
PHOTOPHOBIA: 0
SINUS PAIN: 0
CHOKING: 0
APNEA: 0
FACIAL SWELLING: 0

## 2024-07-20 NOTE — PROGRESS NOTES
PROGRESS NOTE        Consent:    Abel Garcia, was evaluated through a synchronous (real-time) audio-video encounter. The patient (or guardian if applicable) is aware that this is a billable service, which includes applicable co-pays. This Virtual Visit was conducted with patient's (and/or legal guardian's) consent. Patient identification was verified, and a caregiver was present when appropriate.   The patient was located at Home: [address] [facility] [address]  Provider was located at Facility (Appt Dept): [address]  [place] [address]  Confirm you are appropriately licensed, registered, or certified to deliver care in the state where the patient is located as indicated above. If you are not or unsure, please re-schedule the visit: Yes, I confirm.        On this date 7/15/2024 I have spent 20 minutes reviewing previous notes, test results and face to face (virtual) with the patient discussing the diagnosis and importance of compliance with the treatment plan as well as documenting on the day of the visit.. Greater than 50% of this visit was spent counseling the patient about test results, prognosis, importance of compliance, education about disease process, benefits of medications, instructions for management of acute symptoms, and follow up plans.      Chief Complaint   Patient presents with    Follow-up       SUBJECTIVE:     Abel Garcia is a very pleasant 33 y.o. female , with past medical history significant for IBS and anxiety, seen virtually regarding medication follow-up.  She reports doing quite well on Lexapro 5 mg and reports a significant improvement in her symptoms.  She reports no side effects.  She would like to continue with current regimen at this time.      Past Medical History, Past Surgical History, Family history, Social History, and Medications were all reviewed with the patient today and updated as necessary.       Current Outpatient

## 2024-08-16 ENCOUNTER — OFFICE VISIT (OUTPATIENT)
Age: 34
End: 2024-08-16
Payer: COMMERCIAL

## 2024-08-16 VITALS
RESPIRATION RATE: 17 BRPM | BODY MASS INDEX: 34.72 KG/M2 | DIASTOLIC BLOOD PRESSURE: 74 MMHG | SYSTOLIC BLOOD PRESSURE: 111 MMHG | OXYGEN SATURATION: 99 % | HEART RATE: 65 BPM | WEIGHT: 216 LBS | HEIGHT: 66 IN

## 2024-08-16 DIAGNOSIS — K58.0 IRRITABLE BOWEL SYNDROME WITH DIARRHEA: Primary | ICD-10-CM

## 2024-08-16 PROCEDURE — 99213 OFFICE O/P EST LOW 20 MIN: CPT | Performed by: PHYSICIAN ASSISTANT

## 2024-08-16 RX ORDER — DICYCLOMINE HCL 20 MG
20 TABLET ORAL 4 TIMES DAILY
Qty: 120 TABLET | Refills: 2 | Status: SHIPPED | OUTPATIENT
Start: 2024-08-16

## 2024-08-16 NOTE — PROGRESS NOTES
Abel Garcia (:  1990) is a 33 y.o. female new patient referred to our office for evaluation of the following chief complaint(s):  Follow-up (No other concerns)        Assessment & Plan   ASSESSMENT/PLAN:  1. Irritable bowel syndrome with diarrhea  -     dicyclomine (BENTYL) 20 MG tablet; Take 1 tablet by mouth 4 times daily, Disp-120 tablet, R-2Normal  -     Amb Referral to Nutrition Services       -Celiac and IBD screening tests were negative. Has seen improvement with Bentyl 10 mg TID; less cramping. Still having loose stool typically once a day. No rectal bleeding since before last OV. Discussed options including colonoscopy; she would like to work on diet modifications including low-FODMAP diet to evaluate trigger foods. Will refer to dietician for assistance. Add psyllium husk fiber supplement and increase Bentyl to 20 mg QID. Could consider adding Elavil but would need to consider potential interaction with Lexapro.    Subjective   SUBJECTIVE/OBJECTIVE  Abel Garcia is a 33 y.o. year old female with PMH pertinent for  PMH is pertinent for IBS and obesity with BMI 34.22. She denies abdominal surgical history.     Patient was initially evaluated in my office 2024 for chronic diarrhea with BRBPR with no obvious hemorrhoid on exam. CRP, ESR, stool calprotectin, celiac screening tests were all WNL. She was given Rx for Bentyl 10 mg TID as well as Anusol 2.5% cream for possible small internal hemorrhoid not appreciated on exam. Advised to start fiber supplementation and low-FODMAP diet to identify food triggers.     Patient presents today for routine follow-up. Patient reports she hasn't changed much dietary wise. The Bentyl has helped improve abdominal cramping; she takes with meals TID. Rectal bleeding has always been small volume and intermittent historically but hasn't had any bleeding since last OV. Continues to have intermittent diarrhea with fecal urgency. This typically

## 2024-09-15 DIAGNOSIS — G47.00 INSOMNIA, UNSPECIFIED TYPE: ICD-10-CM

## 2024-09-15 DIAGNOSIS — F41.1 GENERALIZED ANXIETY DISORDER: ICD-10-CM

## 2024-09-16 RX ORDER — HYDROXYZINE HYDROCHLORIDE 25 MG/1
TABLET, FILM COATED ORAL
Qty: 30 TABLET | Refills: 2 | Status: SHIPPED | OUTPATIENT
Start: 2024-09-16

## 2024-10-14 ENCOUNTER — LAB (OUTPATIENT)
Dept: FAMILY MEDICINE CLINIC | Facility: CLINIC | Age: 34
End: 2024-10-14
Payer: COMMERCIAL

## 2024-10-14 DIAGNOSIS — E55.9 VITAMIN D DEFICIENCY: ICD-10-CM

## 2024-10-14 DIAGNOSIS — Z00.00 LABORATORY EXAMINATION ORDERED AS PART OF A COMPLETE PHYSICAL EXAMINATION: ICD-10-CM

## 2024-10-14 LAB
25(OH)D3 SERPL-MCNC: 23.8 NG/ML (ref 30–100)
ALBUMIN SERPL-MCNC: 4.1 G/DL (ref 3.5–5)
ALBUMIN/GLOB SERPL: 1.3 (ref 1–1.9)
ALP SERPL-CCNC: 62 U/L (ref 35–104)
ALT SERPL-CCNC: 18 U/L (ref 8–45)
ANION GAP SERPL CALC-SCNC: 11 MMOL/L (ref 9–18)
AST SERPL-CCNC: 18 U/L (ref 15–37)
BASOPHILS # BLD: 0 K/UL (ref 0–0.2)
BASOPHILS NFR BLD: 1 % (ref 0–2)
BILIRUB SERPL-MCNC: 0.7 MG/DL (ref 0–1.2)
BILIRUBIN, URINE, POC: NEGATIVE
BLOOD URINE, POC: ABNORMAL
BUN SERPL-MCNC: 11 MG/DL (ref 6–23)
CALCIUM SERPL-MCNC: 9.4 MG/DL (ref 8.8–10.2)
CHLORIDE SERPL-SCNC: 104 MMOL/L (ref 98–107)
CHOLEST SERPL-MCNC: 199 MG/DL (ref 0–200)
CO2 SERPL-SCNC: 24 MMOL/L (ref 20–28)
CREAT SERPL-MCNC: 0.89 MG/DL (ref 0.6–1.1)
DIFFERENTIAL METHOD BLD: ABNORMAL
EOSINOPHIL # BLD: 0.2 K/UL (ref 0–0.8)
EOSINOPHIL NFR BLD: 3 % (ref 0.5–7.8)
ERYTHROCYTE [DISTWIDTH] IN BLOOD BY AUTOMATED COUNT: 13.6 % (ref 11.9–14.6)
GLOBULIN SER CALC-MCNC: 3.1 G/DL (ref 2.3–3.5)
GLUCOSE SERPL-MCNC: 93 MG/DL (ref 70–99)
GLUCOSE URINE, POC: NEGATIVE
HCT VFR BLD AUTO: 46.8 % (ref 35.8–46.3)
HDLC SERPL-MCNC: 34 MG/DL (ref 40–60)
HDLC SERPL: 5.9 (ref 0–5)
HGB BLD-MCNC: 15.7 G/DL (ref 11.7–15.4)
IMM GRANULOCYTES # BLD AUTO: 0 K/UL (ref 0–0.5)
IMM GRANULOCYTES NFR BLD AUTO: 0 % (ref 0–5)
KETONES, URINE, POC: NEGATIVE
LDLC SERPL CALC-MCNC: 106 MG/DL (ref 0–100)
LEUKOCYTE ESTERASE, URINE, POC: ABNORMAL
LYMPHOCYTES # BLD: 1.6 K/UL (ref 0.5–4.6)
LYMPHOCYTES NFR BLD: 32 % (ref 13–44)
MCH RBC QN AUTO: 31.4 PG (ref 26.1–32.9)
MCHC RBC AUTO-ENTMCNC: 33.5 G/DL (ref 31.4–35)
MCV RBC AUTO: 93.6 FL (ref 82–102)
MONOCYTES # BLD: 0.4 K/UL (ref 0.1–1.3)
MONOCYTES NFR BLD: 8 % (ref 4–12)
NEUTS SEG # BLD: 2.9 K/UL (ref 1.7–8.2)
NEUTS SEG NFR BLD: 56 % (ref 43–78)
NITRITE, URINE, POC: NEGATIVE
NRBC # BLD: 0 K/UL (ref 0–0.2)
PH, URINE, POC: 5.5 (ref 4.6–8)
PLATELET # BLD AUTO: 287 K/UL (ref 150–450)
PMV BLD AUTO: 10.1 FL (ref 9.4–12.3)
POTASSIUM SERPL-SCNC: 4.3 MMOL/L (ref 3.5–5.1)
PROT SERPL-MCNC: 7.1 G/DL (ref 6.3–8.2)
PROTEIN,URINE, POC: NEGATIVE
RBC # BLD AUTO: 5 M/UL (ref 4.05–5.2)
SODIUM SERPL-SCNC: 139 MMOL/L (ref 136–145)
SPECIFIC GRAVITY, URINE, POC: 1.03 (ref 1–1.03)
TRIGL SERPL-MCNC: 299 MG/DL (ref 0–150)
TSH W FREE THYROID IF ABNORMAL: 1.74 UIU/ML (ref 0.27–4.2)
URINALYSIS CLARITY, POC: CLEAR
URINALYSIS COLOR, POC: YELLOW
UROBILINOGEN, POC: ABNORMAL
VLDLC SERPL CALC-MCNC: 60 MG/DL (ref 6–23)
WBC # BLD AUTO: 5.1 K/UL (ref 4.3–11.1)

## 2024-10-14 PROCEDURE — 81003 URINALYSIS AUTO W/O SCOPE: CPT | Performed by: NURSE PRACTITIONER

## 2024-10-25 ENCOUNTER — OFFICE VISIT (OUTPATIENT)
Dept: FAMILY MEDICINE CLINIC | Facility: CLINIC | Age: 34
End: 2024-10-25

## 2024-10-25 VITALS
HEIGHT: 66 IN | RESPIRATION RATE: 16 BRPM | SYSTOLIC BLOOD PRESSURE: 110 MMHG | WEIGHT: 218.6 LBS | TEMPERATURE: 98 F | HEART RATE: 70 BPM | BODY MASS INDEX: 35.13 KG/M2 | OXYGEN SATURATION: 97 % | DIASTOLIC BLOOD PRESSURE: 76 MMHG

## 2024-10-25 DIAGNOSIS — G47.00 INSOMNIA, UNSPECIFIED TYPE: ICD-10-CM

## 2024-10-25 DIAGNOSIS — F32.A DEPRESSION, UNSPECIFIED DEPRESSION TYPE: ICD-10-CM

## 2024-10-25 DIAGNOSIS — Z00.00 ROUTINE GENERAL MEDICAL EXAMINATION AT A HEALTH CARE FACILITY: Primary | ICD-10-CM

## 2024-10-25 DIAGNOSIS — F41.1 GENERALIZED ANXIETY DISORDER: ICD-10-CM

## 2024-10-25 DIAGNOSIS — E78.1 HYPERTRIGLYCERIDEMIA: ICD-10-CM

## 2024-10-25 DIAGNOSIS — E55.9 VITAMIN D DEFICIENCY: ICD-10-CM

## 2024-10-25 DIAGNOSIS — Z23 ENCOUNTER FOR IMMUNIZATION: ICD-10-CM

## 2024-10-25 RX ORDER — TRAZODONE HYDROCHLORIDE 50 MG/1
TABLET, FILM COATED ORAL
Qty: 90 TABLET | Refills: 1 | Status: SHIPPED | OUTPATIENT
Start: 2024-10-25

## 2024-10-25 RX ORDER — ERGOCALCIFEROL 1.25 MG/1
50000 CAPSULE, LIQUID FILLED ORAL
Qty: 4 CAPSULE | Refills: 2 | Status: SHIPPED | OUTPATIENT
Start: 2024-10-25

## 2024-10-25 RX ORDER — ESCITALOPRAM OXALATE 10 MG/1
10 TABLET ORAL DAILY
Qty: 90 TABLET | Refills: 1 | Status: SHIPPED | OUTPATIENT
Start: 2024-10-25

## 2024-10-25 RX ORDER — FENOFIBRATE 160 MG/1
160 TABLET ORAL DAILY
Qty: 90 TABLET | Refills: 1 | Status: SHIPPED | OUTPATIENT
Start: 2024-10-25

## 2024-10-25 ASSESSMENT — ENCOUNTER SYMPTOMS
ALLERGIC/IMMUNOLOGIC NEGATIVE: 1
BLOOD IN STOOL: 0
NAUSEA: 0
GASTROINTESTINAL NEGATIVE: 1
COUGH: 0
BACK PAIN: 0
ANAL BLEEDING: 0
CONSTIPATION: 0
SORE THROAT: 0
CHEST TIGHTNESS: 0
SINUS PRESSURE: 0
STRIDOR: 0
PHOTOPHOBIA: 0
RESPIRATORY NEGATIVE: 1
FACIAL SWELLING: 0
EYES NEGATIVE: 1
SHORTNESS OF BREATH: 0
EYE ITCHING: 0
RECTAL PAIN: 0
APNEA: 0
RHINORRHEA: 0
ABDOMINAL PAIN: 0
CHOKING: 0
COLOR CHANGE: 0
EYE PAIN: 0
WHEEZING: 0
TROUBLE SWALLOWING: 0
EYE REDNESS: 0
VOMITING: 0
DIARRHEA: 0
EYE DISCHARGE: 0
SINUS PAIN: 0
VOICE CHANGE: 0
ABDOMINAL DISTENTION: 0

## 2024-10-25 ASSESSMENT — PATIENT HEALTH QUESTIONNAIRE - PHQ9
SUM OF ALL RESPONSES TO PHQ QUESTIONS 1-9: 0
SUM OF ALL RESPONSES TO PHQ QUESTIONS 1-9: 0
2. FEELING DOWN, DEPRESSED OR HOPELESS: NOT AT ALL
SUM OF ALL RESPONSES TO PHQ9 QUESTIONS 1 & 2: 0
SUM OF ALL RESPONSES TO PHQ QUESTIONS 1-9: 0
SUM OF ALL RESPONSES TO PHQ QUESTIONS 1-9: 0
1. LITTLE INTEREST OR PLEASURE IN DOING THINGS: NOT AT ALL

## 2024-10-25 NOTE — PROGRESS NOTES
avoid social drugs and tobacco, limit alcohol, fall safety, personal safety with appropriate use of helmets and equipment for protection, and appropriate use of sun screen and sun protection to prevent skin cancer. Encourage healthy diet and exercise daily.     Patient does not meet criteria for mammogram screening.  She is established with GYN for her GYN screening.  She is up-to-date.  She does not meet criteria for colon cancer screening.    2. Generalized anxiety disorder  -     escitalopram (LEXAPRO) 10 MG tablet; Take 1 tablet by mouth daily With food for prevention of depression/anxiety, Disp-90 tablet, R-1Normal  3. Depression, unspecified depression type  -     escitalopram (LEXAPRO) 10 MG tablet; Take 1 tablet by mouth daily With food for prevention of depression/anxiety, Disp-90 tablet, R-1Normal    Stable but reports still symptomatic.  Would like to try higher dosing.  We will increase dosing to 10 mg/day.  We will follow-up in 3 months.    4. Hypertriglyceridemia  -     fenofibrate 160 MG tablet; Take 1 tablet by mouth daily With food for triglyceride lowering, Disp-90 tablet, R-1Normal    Triglycerides unfortunately still remains high despite dietary lifestyle changes.  We will have her continue with her modification but we will also add in fenofibrate daily for reduction.  Patient is amenable to plan and is agreeable to plan.  Will plan to recheck labs in 3 months.    5. Vitamin D deficiency  -     vitamin D (ERGOCALCIFEROL) 1.25 MG (21870 UT) CAPS capsule; Take 1 capsule by mouth every 7 days With food for vitamin D supplementation, Disp-4 capsule, R-2Normal    Vitamin D low. Start ergocalciferol 50,000 international units once weekly with food for supplementation.  Repeat level in 3 months.    6. Insomnia, unspecified type  -     traZODone (DESYREL) 50 MG tablet; Take 1/2 to 1 tablet orally at bedtime as needed for sleep., Disp-90 tablet, R-1Normal    Still having issue with insomnia.  Taking

## 2024-11-22 ENCOUNTER — TELEPHONE (OUTPATIENT)
Dept: ORTHOPEDIC SURGERY | Age: 34
End: 2024-11-22

## 2024-11-22 NOTE — TELEPHONE ENCOUNTER
I made apt 11/27 for    rt knee pain fell last night not swollen but can barely walk on that knee  , BCNISHA is friends with Mars, patient wants to know if someone can see her sooner is in a lot of pain, Mars told her to call office and try and get in soon

## 2024-11-27 ENCOUNTER — OFFICE VISIT (OUTPATIENT)
Dept: ORTHOPEDIC SURGERY | Age: 34
End: 2024-11-27
Payer: COMMERCIAL

## 2024-11-27 DIAGNOSIS — M25.562 PAIN IN BOTH KNEES, UNSPECIFIED CHRONICITY: Primary | ICD-10-CM

## 2024-11-27 DIAGNOSIS — M25.561 PAIN IN BOTH KNEES, UNSPECIFIED CHRONICITY: Primary | ICD-10-CM

## 2024-11-27 DIAGNOSIS — S89.92XA INJURY OF LEFT KNEE, INITIAL ENCOUNTER: ICD-10-CM

## 2024-11-27 DIAGNOSIS — S89.91XA INJURY OF RIGHT KNEE, INITIAL ENCOUNTER: ICD-10-CM

## 2024-11-27 PROCEDURE — 99203 OFFICE O/P NEW LOW 30 MIN: CPT | Performed by: ORTHOPAEDIC SURGERY

## 2024-11-27 NOTE — PROGRESS NOTES
Name: Abel Garcia  YOB: 1990  Gender: female  MRN: [id number]     CC: Knee pain    HPI:   2024: Reports falling directly on both knees:   Appointment made for Right knee but at time of appointment she requested bilateral knee evaluation  2024: Initial visit: Bilateral knee pain    ROS/Meds/PSH/PMH/FH/SH: Only reviewed pertinent/relevant information      Tobacco:  reports that she has never smoked. She has never used smokeless tobacco.     Physical Examination:  Patient appears to be alert and oriented with acceptable appearance.  No obvious distress or SOB  CV: LE acceptable vascular flow, color and capillary refill  Neuro: LE mostly intact light touch sensation   Skin: Bilateral = anterior knee bruising; no redness; soft knee prepatellar swelling  MS: Standing: Plantigrade: Gait full  Bilateral = anterior prepatellar soft tissue swelling with mild pain  Bilateral = no quadriceps or patella tendon pain  Bilateral = no compression patellofemoral pain   Bilateral = no joint line pain  Bilateral = full knee motion; 5/5 strength; no crepitance; no gross instability    Reviewed Test/Records/Documents:   2006: Dr. Baumgarten: Right knee pain 5-day duration: Reports similar problem a couple years prior: MRI scan with bone bruisin2006 exam with pain in the patellofemoral area: Started NSAID:  10/25/2024: Mrs. Knapp: APRN-CNP: IBS: REYMUNDO: Depression: Hypertriglyceridemia: Vitamin D deficiency: Insomnia: Medications: Lexapro: Fenofibrate: 50,000 units vitamin D: Desyrel   2024: Telephone note: Acute right knee pain after falling last night: Can barely walk on the knee:     XR: Right knee: Left knee: Standing AP lateral notch sunrise views taken today with intercondylar notch thickening with subchondral cysts; narrowing medial columns; patellofemoral patella subchondral surface cyst; no appreciable acute fracture  XR Impression:  As above       Assessment:    Bilateral

## 2024-12-20 ENCOUNTER — TELEPHONE (OUTPATIENT)
Dept: ORTHOPEDIC SURGERY | Age: 34
End: 2024-12-20

## 2024-12-20 DIAGNOSIS — M25.562 PAIN IN BOTH KNEES, UNSPECIFIED CHRONICITY: ICD-10-CM

## 2024-12-20 DIAGNOSIS — S89.91XA INJURY OF RIGHT KNEE, INITIAL ENCOUNTER: Primary | ICD-10-CM

## 2024-12-20 DIAGNOSIS — M25.561 PAIN IN BOTH KNEES, UNSPECIFIED CHRONICITY: ICD-10-CM

## 2024-12-20 NOTE — TELEPHONE ENCOUNTER
MRI RIGHT KNEE APPROVAL     Status   Current Status: Approved   Validity Period: 12/20/2024 - 1/19/2025   Authorization: [id number] (Knee MRI (right))   Patient   Name: NAINA VERNON   Subscriber ID: [id number]   YOB: 1990   Gender: Female   Physician   Name: MEL PENNY   Provider ID: [id number]

## 2024-12-30 NOTE — PROGRESS NOTES
Name: Abel Garcia  YOB: 1990  Gender: female  MRN: [id number]     2024: To discuss: Right knee MRI scan    HPI:   2024: Reports falling directly on both knees:   Appointment made for Right knee but at time of appointment she requested bilateral knee evaluation  2024: Initial visit: Bilateral knee pain    ROS/Meds/PSH/PMH/FH/SH: Only reviewed pertinent/relevant information      Tobacco:  reports that she has never smoked. She has never used smokeless tobacco.     Physical Examination:  Patient appears to be alert and oriented with acceptable appearance.  No obvious distress or SOB  CV: LE acceptable vascular flow, color and capillary refill  Neuro: LE mostly intact light touch sensation   Skin: Bilateral = no gross knee effusion: Right patellofemoral KT taping  MS: Standing: Plantigrade: Gait full  Right = mild medial knee pain  Right = mild anterior patellar soft tissue pain  Bilateral = no quadriceps or patella tendon pain   Bilateral = full knee motion; 5/5 strength; no crepitance; no MCL instability    Reviewed Test/Records/Documents:   2006: Dr. Baumgarten: Right knee pain 5-day duration: Reports similar problem a couple years prior: MRI scan with bone bruisin2006 exam with pain in the patellofemoral area: Started NSAID:  10/25/2024: Mrs. Knapp: APRN-CNP: IBS: REYMUNDO: Depression: Hypertriglyceridemia: Vitamin D deficiency: Insomnia: Medications: Lexapro: Fenofibrate: 50,000 units vitamin D: Desyrel   2024: Telephone note: Acute right knee pain after falling last night: Can barely walk on the knee:     XR: Right knee: Left knee: Standing AP lateral notch sunrise views taken 2024 with intercondylar notch thickening with subchondral cysts; narrowing medial columns; patellofemoral patella subchondral surface cyst; no appreciable acute fracture  XR Impression:  As above       2024: Right knee MRI scan: Radiology impression:  1.  Mild MCL

## 2024-12-31 ENCOUNTER — OFFICE VISIT (OUTPATIENT)
Dept: ORTHOPEDIC SURGERY | Age: 34
End: 2024-12-31
Payer: COMMERCIAL

## 2024-12-31 DIAGNOSIS — S89.92XA INJURY OF LEFT KNEE, INITIAL ENCOUNTER: ICD-10-CM

## 2024-12-31 DIAGNOSIS — M25.562 PAIN IN BOTH KNEES, UNSPECIFIED CHRONICITY: ICD-10-CM

## 2024-12-31 DIAGNOSIS — S89.91XA INJURY OF RIGHT KNEE, INITIAL ENCOUNTER: Primary | ICD-10-CM

## 2024-12-31 DIAGNOSIS — M25.561 PAIN IN BOTH KNEES, UNSPECIFIED CHRONICITY: ICD-10-CM

## 2024-12-31 PROCEDURE — 99213 OFFICE O/P EST LOW 20 MIN: CPT | Performed by: ORTHOPAEDIC SURGERY

## 2024-12-31 RX ORDER — PREDNISONE 5 MG/1
TABLET ORAL
Qty: 1 EACH | Refills: 2 | Status: SHIPPED | OUTPATIENT
Start: 2024-12-31

## 2025-04-16 ENCOUNTER — OFFICE VISIT (OUTPATIENT)
Dept: ENT CLINIC | Age: 35
End: 2025-04-16

## 2025-04-16 VITALS — BODY MASS INDEX: 36.9 KG/M2 | HEIGHT: 66 IN | WEIGHT: 229.6 LBS | RESPIRATION RATE: 10 BRPM

## 2025-04-16 DIAGNOSIS — H69.92 ETD (EUSTACHIAN TUBE DYSFUNCTION), LEFT: Primary | ICD-10-CM

## 2025-04-16 ASSESSMENT — ENCOUNTER SYMPTOMS
ALLERGIC/IMMUNOLOGIC NEGATIVE: 1
GASTROINTESTINAL NEGATIVE: 1
RESPIRATORY NEGATIVE: 1
EYES NEGATIVE: 1

## 2025-04-16 NOTE — PROGRESS NOTES
4/16/2025)    levonorgestrel (MIRENA) IUD 52 mg 1 Device by IntraUTERine route once 2017 (Patient not taking: Reported on 4/16/2025)     No current facility-administered medications for this visit.     Past Medical History:   Diagnosis Date    Bulging lumbar disc     Irritable bowel syndrome      Social History     Tobacco Use    Smoking status: Never    Smokeless tobacco: Never   Substance Use Topics    Alcohol use: Yes     Comment: Drink socially.     Past Surgical History:   Procedure Laterality Date    WISDOM TOOTH EXTRACTION       Family History   Problem Relation Age of Onset    Hypertension Mother     High Cholesterol Mother     High Blood Pressure Mother     No Known Problems Father     No Known Problems Sister     No Known Problems Brother     No Known Problems Maternal Aunt     No Known Problems Maternal Uncle     No Known Problems Paternal Aunt     No Known Problems Paternal Uncle     No Known Problems Maternal Grandmother     No Known Problems Maternal Grandfather     No Known Problems Paternal Grandmother     No Known Problems Paternal Grandfather         ROS:    Review of Systems   Constitutional: Negative.    HENT: Negative.     Eyes: Negative.    Respiratory: Negative.     Cardiovascular: Negative.    Gastrointestinal: Negative.    Endocrine: Negative.    Genitourinary: Negative.    Musculoskeletal: Negative.    Skin: Negative.    Allergic/Immunologic: Negative.    Neurological: Negative.    Hematological: Negative.    Psychiatric/Behavioral: Negative.          PHYSICAL EXAM:    Resp 10   Ht 1.676 m (5' 5.98\")   Wt 104.1 kg (229 lb 9.6 oz)   BMI 37.08 kg/m²     General: NAD, well-appearing  Neuro: No gross neuro deficits. CN's II-XII intact. No facial weakness.  Eyes: EOMI. Pupils reactive. No periorbital edema/ecchymosis. No nystagmus.  Skin: No facial erythema, rashes or concerning lesions.  Nose: No external deviations or saddling. Intranasally, septum is midline without perforations, nasal

## 2025-04-22 NOTE — PROGRESS NOTES
HPI    Abel Garcia is a 34 y.o. female seen for annual GYN exam.  She is due to have her Mirena IUD replaced    Past Medical History, Past Surgical History, Family history, Social History, and Medications were all reviewed with the patient today and updated as necessary.     Current Outpatient Medications   Medication Sig    fenofibrate 160 MG tablet Take 1 tablet by mouth daily With food for triglyceride lowering    escitalopram (LEXAPRO) 10 MG tablet Take 1 tablet by mouth daily With food for prevention of depression/anxiety    levonorgestrel (MIRENA) IUD 52 mg 1 Device by IntraUTERine route once 2017    predniSONE 5 MG (48) TBPK Take as directed per package instructions (Patient not taking: Reported on 4/16/2025)    traZODone (DESYREL) 50 MG tablet Take 1/2 to 1 tablet orally at bedtime as needed for sleep. (Patient not taking: Reported on 4/16/2025)    dicyclomine (BENTYL) 20 MG tablet Take 1 tablet by mouth 4 times daily (Patient not taking: Reported on 4/24/2025)    hydrocortisone (ANUSOL-HC) 2.5 % CREA rectal cream Place 1 Application rectally 3 times daily as needed for Hemorrhoids (Patient not taking: Reported on 4/16/2025)     No current facility-administered medications for this visit.     Allergies   Allergen Reactions    Cefaclor Other (See Comments)     As a child     Past Medical History:   Diagnosis Date    Bulging lumbar disc     Irritable bowel syndrome      Past Surgical History:   Procedure Laterality Date    WISDOM TOOTH EXTRACTION       Family History   Problem Relation Age of Onset    Hypertension Mother     High Cholesterol Mother     High Blood Pressure Mother     No Known Problems Father     No Known Problems Sister     No Known Problems Brother     No Known Problems Maternal Aunt     No Known Problems Maternal Uncle     No Known Problems Paternal Aunt     No Known Problems Paternal Uncle     No Known Problems Maternal Grandmother     No Known Problems Maternal Grandfather     No

## 2025-04-23 SDOH — ECONOMIC STABILITY: FOOD INSECURITY: WITHIN THE PAST 12 MONTHS, YOU WORRIED THAT YOUR FOOD WOULD RUN OUT BEFORE YOU GOT MONEY TO BUY MORE.: NEVER TRUE

## 2025-04-23 SDOH — ECONOMIC STABILITY: FOOD INSECURITY: WITHIN THE PAST 12 MONTHS, THE FOOD YOU BOUGHT JUST DIDN'T LAST AND YOU DIDN'T HAVE MONEY TO GET MORE.: NEVER TRUE

## 2025-04-23 SDOH — ECONOMIC STABILITY: INCOME INSECURITY: IN THE LAST 12 MONTHS, WAS THERE A TIME WHEN YOU WERE NOT ABLE TO PAY THE MORTGAGE OR RENT ON TIME?: NO

## 2025-04-23 SDOH — ECONOMIC STABILITY: TRANSPORTATION INSECURITY
IN THE PAST 12 MONTHS, HAS THE LACK OF TRANSPORTATION KEPT YOU FROM MEDICAL APPOINTMENTS OR FROM GETTING MEDICATIONS?: NO

## 2025-04-23 SDOH — ECONOMIC STABILITY: TRANSPORTATION INSECURITY
IN THE PAST 12 MONTHS, HAS LACK OF TRANSPORTATION KEPT YOU FROM MEETINGS, WORK, OR FROM GETTING THINGS NEEDED FOR DAILY LIVING?: NO

## 2025-04-24 ENCOUNTER — OFFICE VISIT (OUTPATIENT)
Dept: OBGYN CLINIC | Age: 35
End: 2025-04-24
Payer: COMMERCIAL

## 2025-04-24 VITALS
SYSTOLIC BLOOD PRESSURE: 122 MMHG | DIASTOLIC BLOOD PRESSURE: 82 MMHG | HEIGHT: 66 IN | BODY MASS INDEX: 36.16 KG/M2 | WEIGHT: 225 LBS

## 2025-04-24 DIAGNOSIS — Z12.4 SCREENING FOR MALIGNANT NEOPLASM OF CERVIX: ICD-10-CM

## 2025-04-24 DIAGNOSIS — Z30.433 ENCOUNTER FOR REMOVAL AND REINSERTION OF INTRAUTERINE CONTRACEPTIVE DEVICE (IUD): ICD-10-CM

## 2025-04-24 DIAGNOSIS — Z01.419 WELL WOMAN EXAM: Primary | ICD-10-CM

## 2025-04-24 PROCEDURE — 99459 PELVIC EXAMINATION: CPT | Performed by: OBSTETRICS & GYNECOLOGY

## 2025-04-24 PROCEDURE — 99395 PREV VISIT EST AGE 18-39: CPT | Performed by: OBSTETRICS & GYNECOLOGY

## 2025-04-24 RX ORDER — KETOROLAC TROMETHAMINE 10 MG/1
TABLET, FILM COATED ORAL
Qty: 2 TABLET | Refills: 0 | Status: SHIPPED | OUTPATIENT
Start: 2025-04-24 | End: 2026-04-24

## 2025-04-24 RX ORDER — MISOPROSTOL 200 UG/1
TABLET ORAL
Qty: 2 TABLET | Refills: 0 | Status: SHIPPED | OUTPATIENT
Start: 2025-04-24

## 2025-04-25 DIAGNOSIS — F32.A DEPRESSION, UNSPECIFIED DEPRESSION TYPE: ICD-10-CM

## 2025-04-25 DIAGNOSIS — G47.00 INSOMNIA, UNSPECIFIED TYPE: ICD-10-CM

## 2025-04-25 DIAGNOSIS — E78.1 HYPERTRIGLYCERIDEMIA: ICD-10-CM

## 2025-04-25 DIAGNOSIS — F41.1 GENERALIZED ANXIETY DISORDER: ICD-10-CM

## 2025-04-25 LAB
COLLECTION METHOD: NORMAL
CYTOLOGIST CVX/VAG CYTO: NORMAL
CYTOLOGY CVX/VAG DOC THIN PREP: NORMAL
HPV REFLEX: NORMAL
Lab: NORMAL
PAP SOURCE: NORMAL
PATH REPORT.FINAL DX SPEC: NORMAL
STAT OF ADQ CVX/VAG CYTO-IMP: NORMAL

## 2025-05-06 RX ORDER — FENOFIBRATE 160 MG/1
160 TABLET ORAL DAILY
Qty: 90 TABLET | Refills: 1 | Status: SHIPPED | OUTPATIENT
Start: 2025-05-06

## 2025-05-06 RX ORDER — TRAZODONE HYDROCHLORIDE 50 MG/1
TABLET ORAL
Qty: 90 TABLET | Refills: 1 | Status: SHIPPED | OUTPATIENT
Start: 2025-05-06

## 2025-05-06 RX ORDER — ESCITALOPRAM OXALATE 10 MG/1
10 TABLET ORAL DAILY
Qty: 90 TABLET | Refills: 1 | Status: SHIPPED | OUTPATIENT
Start: 2025-05-06

## 2025-05-11 DIAGNOSIS — E55.9 VITAMIN D DEFICIENCY: ICD-10-CM

## 2025-05-11 DIAGNOSIS — Z01.84 LACK OF IMMUNITY TO HEPATITIS B VIRUS DEMONSTRATED BY SEROLOGIC TEST: ICD-10-CM

## 2025-05-11 DIAGNOSIS — E78.1 HYPERTRIGLYCERIDEMIA: Primary | ICD-10-CM

## 2025-05-11 DIAGNOSIS — K64.8 OTHER HEMORRHOIDS: ICD-10-CM

## 2025-05-13 ENCOUNTER — LAB (OUTPATIENT)
Dept: FAMILY MEDICINE CLINIC | Facility: CLINIC | Age: 35
End: 2025-05-13

## 2025-05-13 DIAGNOSIS — K64.8 OTHER HEMORRHOIDS: ICD-10-CM

## 2025-05-13 DIAGNOSIS — E78.1 HYPERTRIGLYCERIDEMIA: ICD-10-CM

## 2025-05-13 DIAGNOSIS — Z01.84 LACK OF IMMUNITY TO HEPATITIS B VIRUS DEMONSTRATED BY SEROLOGIC TEST: ICD-10-CM

## 2025-05-13 DIAGNOSIS — E55.9 VITAMIN D DEFICIENCY: ICD-10-CM

## 2025-05-13 LAB
25(OH)D3 SERPL-MCNC: 24.2 NG/ML (ref 30–100)
ALBUMIN SERPL-MCNC: 4.1 G/DL (ref 3.5–5)
ALBUMIN/GLOB SERPL: 1.4 (ref 1–1.9)
ALP SERPL-CCNC: 48 U/L (ref 35–104)
ALT SERPL-CCNC: 23 U/L (ref 8–45)
ANION GAP SERPL CALC-SCNC: 11 MMOL/L (ref 7–16)
AST SERPL-CCNC: 22 U/L (ref 15–37)
BASOPHILS # BLD: 0.03 K/UL (ref 0–0.2)
BASOPHILS NFR BLD: 0.7 % (ref 0–2)
BILIRUB SERPL-MCNC: 0.6 MG/DL (ref 0–1.2)
BUN SERPL-MCNC: 12 MG/DL (ref 6–23)
CALCIUM SERPL-MCNC: 9.9 MG/DL (ref 8.8–10.2)
CHLORIDE SERPL-SCNC: 104 MMOL/L (ref 98–107)
CHOLEST SERPL-MCNC: 220 MG/DL (ref 0–200)
CO2 SERPL-SCNC: 24 MMOL/L (ref 20–29)
CREAT SERPL-MCNC: 0.87 MG/DL (ref 0.6–1.1)
DIFFERENTIAL METHOD BLD: NORMAL
EOSINOPHIL # BLD: 0.12 K/UL (ref 0–0.8)
EOSINOPHIL NFR BLD: 2.7 % (ref 0.5–7.8)
ERYTHROCYTE [DISTWIDTH] IN BLOOD BY AUTOMATED COUNT: 12.9 % (ref 11.9–14.6)
GLOBULIN SER CALC-MCNC: 2.9 G/DL (ref 2.3–3.5)
GLUCOSE SERPL-MCNC: 92 MG/DL (ref 70–99)
HBV SURFACE AB SERPL IA-ACNC: 39.5 MIU/ML
HCT VFR BLD AUTO: 43.4 % (ref 35.8–46.3)
HDLC SERPL-MCNC: 38 MG/DL (ref 40–60)
HDLC SERPL: 5.8 (ref 0–5)
HGB BLD-MCNC: 15 G/DL (ref 11.7–15.4)
IMM GRANULOCYTES # BLD AUTO: 0.01 K/UL (ref 0–0.5)
IMM GRANULOCYTES NFR BLD AUTO: 0.2 % (ref 0–5)
LDLC SERPL CALC-MCNC: 128 MG/DL (ref 0–100)
LYMPHOCYTES # BLD: 1.58 K/UL (ref 0.5–4.6)
LYMPHOCYTES NFR BLD: 36 % (ref 13–44)
MCH RBC QN AUTO: 31.1 PG (ref 26.1–32.9)
MCHC RBC AUTO-ENTMCNC: 34.6 G/DL (ref 31.4–35)
MCV RBC AUTO: 90 FL (ref 82–102)
MONOCYTES # BLD: 0.32 K/UL (ref 0.1–1.3)
MONOCYTES NFR BLD: 7.3 % (ref 4–12)
NEUTS SEG # BLD: 2.33 K/UL (ref 1.7–8.2)
NEUTS SEG NFR BLD: 53.1 % (ref 43–78)
NRBC # BLD: 0 K/UL (ref 0–0.2)
PLATELET # BLD AUTO: 309 K/UL (ref 150–450)
PMV BLD AUTO: 10 FL (ref 9.4–12.3)
POTASSIUM SERPL-SCNC: 4.1 MMOL/L (ref 3.5–5.1)
PROT SERPL-MCNC: 7.1 G/DL (ref 6.3–8.2)
RBC # BLD AUTO: 4.82 M/UL (ref 4.05–5.2)
SODIUM SERPL-SCNC: 139 MMOL/L (ref 136–145)
TRIGL SERPL-MCNC: 269 MG/DL (ref 0–150)
VLDLC SERPL CALC-MCNC: 54 MG/DL (ref 6–23)
WBC # BLD AUTO: 4.4 K/UL (ref 4.3–11.1)

## 2025-05-14 ENCOUNTER — RESULTS FOLLOW-UP (OUTPATIENT)
Dept: FAMILY MEDICINE CLINIC | Facility: CLINIC | Age: 35
End: 2025-05-14

## 2025-05-26 DIAGNOSIS — Z30.433 ENCOUNTER FOR REMOVAL AND REINSERTION OF INTRAUTERINE CONTRACEPTIVE DEVICE (IUD): ICD-10-CM

## 2025-05-27 RX ORDER — MISOPROSTOL 200 UG/1
TABLET ORAL
Qty: 2 TABLET | Refills: 0 | OUTPATIENT
Start: 2025-05-27